# Patient Record
(demographics unavailable — no encounter records)

---

## 2017-03-22 NOTE — C.PDOC
History Of Present Illness


Pt c/o suprapubic pain that started 2 days ago and gradually improved. 


Time Seen by Provider: 03/22/17 12:19


Chief Complaint (Nursing): Abdominal Pain


History Per: Patient


Onset/Duration Of Symptoms: Days (2)


Current Symptoms Are (Timing): Gone


Severity: Moderate


Location Of Pain/Discomfort: Suprapubic


Quality Of Discomfort: "Pain"


Exacerbating Factors: Other (Urination)


Alleviating Factors: OTC Meds


Additional History Per: Prior Records





Past Medical History


Reviewed: Historical Data, Nursing Documentation, Vital Signs


Vital Signs: 





 Last Vital Signs











Temp  97.8 F   03/22/17 10:42


 


Pulse  78   03/22/17 10:42


 


Resp  18   03/22/17 10:42


 


BP  121/77   03/22/17 10:42


 


Pulse Ox  97   03/22/17 10:42














- Medical History


PMH: No Chronic Diseases


Surgical History: No Surg Hx


Family History: States: Other (Kidney stones)





- Social History


Hx Tobacco Use: No


Hx Alcohol Use: No


Hx Substance Use: No





- Immunization History


Hx Tetanus Toxoid Vaccination: No


Hx Influenza Vaccination: No


Hx Pneumococcal Vaccination: No





Review Of Systems


Except As Marked, All Systems Reviewed And Found Negative.


Constitutional: Negative for: Fever, Weakness


Cardiovascular: Negative for: Chest Pain


Respiratory: Negative for: Shortness of Breath


Gastrointestinal: Negative for: Vomiting, Diarrhea, Constipation


Genitourinary: Negative for: Scrotal Pain


Musculoskeletal: Negative for: Neck Pain, Back Pain


Skin: Negative for: Rash


Neurological: Negative for: Weakness, Numbness, Seizures, Altered Mental Status





Physical Exam





- Physical Exam


Appears: Non-toxic, No Acute Distress


Skin: Normal Color, Warm, Dry, No Rash


Head: Atraumatic, Normacephalic


Eye(s): bilateral: PERRL, EOMI


Neck: Normal ROM, Supple


Cardiovascular: Rhythm Regular


Respiratory: Normal Breath Sounds, No Accessory Muscle Use


Gastrointestinal/Abdominal: Soft, No Tenderness


Back: No CVA Tenderness


Male Genital: No Testicular Tenderness, No Testicular Swelling, No Inguinal 

Tenderness, No Inguinal Swelling, No Scrotal Swelling, Circumcised


Extremity: Normal ROM


Neurological/Psych: Oriented x3, Normal Motor, Normal Sensation





ED Course And Treatment





- Laboratory Results


Interpretation Of Abnormal: Urinalysis normal


O2 Sat by Pulse Oximetry: 97


Pulse Ox Interpretation: Normal


Reassessment Condition: Improved





Medical Decision Making


Medical Decision Making: 


Pt most like passed a small kidney stone. 





Disposition


Counseled Patient/Family Regarding: Studies Performed, Diagnosis, Need For 

Followup





- Disposition


Referrals: 


Marilee Waters MD [Medical Doctor] - 


Disposition: HOME/ ROUTINE


Disposition Time: 12:45


Condition: IMPROVED


Additional Instructions: 


Follow up with your doctor this week for further evaluation and treatment. 

Return to the ER if you develop fever, vomiting, pain returns, worsening of 

symptoms or if you have any other concerns. 


Forms:  Gen Discharge Inst French, General Discharge Instructions





- Clinical Impression


Clinical Impression: 


 Resolved abdominal pain